# Patient Record
(demographics unavailable — no encounter records)

---

## 2025-02-17 NOTE — PHYSICAL EXAM
[Chaperone Declined] : Patient declined chaperone [Appropriately responsive] : appropriately responsive [Alert] : alert [No Acute Distress] : no acute distress [No Lymphadenopathy] : no lymphadenopathy [No Murmurs] : no murmurs [Soft] : soft [Non-distended] : non-distended [Non-tender] : non-tender [No HSM] : No HSM [No Lesions] : no lesions [No Mass] : no mass [Oriented x3] : oriented x3 [Examination Of The Breasts] : a normal appearance [No Masses] : no breast masses were palpable [Labia Majora] : normal [Labia Minora] : normal [Normal] : normal [Uterine Adnexae] : normal

## 2025-02-17 NOTE — HISTORY OF PRESENT ILLNESS
[Patient reported mammogram was normal] : Patient reported mammogram was normal [Patient reported colonoscopy was normal] : Patient reported colonoscopy was normal [FreeTextEntry1] : Shae King presents for well woman visit with gyn exam.  kids doing well [Mammogramdate] : 4/2024 [ColonoscopyDate] : 7/2023 [TextBox_43] : 5- polyps

## 2025-07-21 NOTE — HISTORY OF PRESENT ILLNESS
[FreeTextEntry1] : 45 yo patient present today for long periods. pt states she has been bleeding since 06/27/25 pt also states she saw a piece of tissue come out  had vasectomy pt also c/o some insomnia; brain fog and last year had joint pain menses usually every month but more spotting during menses and not exactly 28 days  [LMPDate] : 06/27/25

## 2025-07-21 NOTE — PLAN
[FreeTextEntry1] : AUB- emb performed counseling re perimenopause- will check hormone panel to obtain off sono to eval for structural causes of AUB rtn after sono

## 2025-07-21 NOTE — PROCEDURE
[Endometrial Biopsy] : Endometrial biopsy [Irregular Bleeding] : irregular uterine bleeding [Risks] : risks [Benefits] : benefits [Alternatives] : alternatives [Patient] : patient [Infection] : infection [Bleeding] : bleeding [Uterine Perforation] : uterine perforation [Pain] : pain [No Premedication] : No premedication [Ring Forceps] : Ring forceps [Easy Passage] : Easy passage [Sounded to ___ cm] : sounded to [unfilled] ~Ucm [Moderate] : moderate [Sent to Pathology] : placed in buffered formalin and sent for pathology [Tolerated Well] : Patient tolerated the procedure well [No Complications] : No complications

## 2025-07-21 NOTE — HISTORY OF PRESENT ILLNESS
[FreeTextEntry1] : 47 yo patient present today for long periods. pt states she has been bleeding since 06/27/25 pt also states she saw a piece of tissue come out  had vasectomy pt also c/o some insomnia; brain fog and last year had joint pain menses usually every month but more spotting during menses and not exactly 28 days  [LMPDate] : 06/27/25

## 2025-07-21 NOTE — REVIEW OF SYSTEMS
[Abn Vaginal bleeding] : abnormal vaginal bleeding [Negative] : Breast [FreeTextEntry9] : joint pain/frozen shoulder dx last year; now improved [de-identified] : "brain fog"

## 2025-07-21 NOTE — REVIEW OF SYSTEMS
[Abn Vaginal bleeding] : abnormal vaginal bleeding [Negative] : Breast [FreeTextEntry9] : joint pain/frozen shoulder dx last year; now improved [de-identified] : "brain fog"

## 2025-07-21 NOTE — PHYSICAL EXAM
[Chaperoned Physical Exam] : A chaperone was present in the examining room during all aspects of the physical examination. [Appropriately responsive] : appropriately responsive [Labia Majora] : normal [Labia Minora] : normal [Normal] : normal [Uterine Adnexae] : normal [FreeTextEntry4] : drk blood